# Patient Record
Sex: MALE | Race: OTHER | NOT HISPANIC OR LATINO | URBAN - METROPOLITAN AREA
[De-identification: names, ages, dates, MRNs, and addresses within clinical notes are randomized per-mention and may not be internally consistent; named-entity substitution may affect disease eponyms.]

---

## 2023-07-06 DIAGNOSIS — K63.5 COLON POLYPS: Primary | ICD-10-CM

## 2023-09-05 ENCOUNTER — OFFICE VISIT (OUTPATIENT)
Dept: GASTROENTEROLOGY | Facility: CLINIC | Age: 73
End: 2023-09-05
Payer: MEDICARE

## 2023-09-05 VITALS
OXYGEN SATURATION: 96 % | DIASTOLIC BLOOD PRESSURE: 78 MMHG | WEIGHT: 146 LBS | HEIGHT: 70 IN | BODY MASS INDEX: 20.9 KG/M2 | HEART RATE: 54 BPM | SYSTOLIC BLOOD PRESSURE: 130 MMHG

## 2023-09-05 DIAGNOSIS — Z80.0 FAMILY HISTORY OF COLON CANCER: ICD-10-CM

## 2023-09-05 DIAGNOSIS — Z86.010 HISTORY OF COLON POLYPS: ICD-10-CM

## 2023-09-05 DIAGNOSIS — K57.30 DIVERTICULOSIS OF COLON: Primary | ICD-10-CM

## 2023-09-05 DIAGNOSIS — C61 PROSTATE CANCER: ICD-10-CM

## 2023-09-05 PROCEDURE — 99213 PR OFFICE/OUTPT VISIT, EST, LEVL III, 20-29 MIN: ICD-10-PCS | Mod: S$GLB,,,

## 2023-09-05 PROCEDURE — 99213 OFFICE O/P EST LOW 20 MIN: CPT | Mod: S$GLB,,,

## 2023-09-05 RX ORDER — SOD SULF/POT CHLORIDE/MAG SULF 1.479 G
12 TABLET ORAL DAILY
Qty: 24 TABLET | Refills: 0 | Status: SHIPPED | OUTPATIENT
Start: 2023-09-05 | End: 2023-09-05

## 2023-09-05 RX ORDER — CYCLOBENZAPRINE HCL 10 MG
TABLET ORAL
COMMUNITY
Start: 2023-08-31

## 2023-09-05 RX ORDER — ROSUVASTATIN CALCIUM 10 MG/1
10 TABLET, COATED ORAL NIGHTLY
COMMUNITY
Start: 2023-04-28

## 2023-09-05 RX ORDER — POLYETHYLENE GLYCOL 3350, SODIUM SULFATE ANHYDROUS, SODIUM BICARBONATE, SODIUM CHLORIDE, POTASSIUM CHLORIDE 236; 22.74; 6.74; 5.86; 2.97 G/4L; G/4L; G/4L; G/4L; G/4L
4 POWDER, FOR SOLUTION ORAL ONCE
Qty: 4000 ML | Refills: 0 | Status: SHIPPED | OUTPATIENT
Start: 2023-09-05 | End: 2023-09-05

## 2023-09-05 RX ORDER — TELMISARTAN 40 MG/1
40 TABLET ORAL
COMMUNITY
Start: 2023-05-25

## 2023-09-05 NOTE — PROGRESS NOTES
Clinic Note    Reason for visit:  The primary encounter diagnosis was Diverticulosis of colon. Diagnoses of History of colon polyps, Family history of colon cancer, and Prostate cancer were also pertinent to this visit.    PCP: Tereso Yancey.   [address] [facility]    HPI:  This is a 72 y.o. male who was last seen by Dr. Castro in 11/2020. Patient denies any reflux, dysphagia, abdominal pain, constipation, diarrhea, or blood in stool. He was recently diagnosed with prostate and bone cancer and may be having surgery with Dr. Conor cooley.     Colonoscopy 11/10/2020: Moderate diverticulosis of the sigmoid colon. Serrated polyp-will need to change repeat colon to 3 years instead of 5 due to this type of polyp found    Review of Systems   Constitutional:  Negative for diaphoresis, fatigue, fever and unexpected weight change.   HENT:  Negative for hearing loss, mouth sores, postnasal drip, sore throat and trouble swallowing.    Eyes:  Negative for pain, discharge and eye dryness.   Respiratory:  Negative for apnea, cough, choking, chest tightness, shortness of breath and wheezing.    Cardiovascular:  Negative for chest pain, palpitations and leg swelling.   Gastrointestinal:  Negative for abdominal distention, abdominal pain, anal bleeding, blood in stool, change in bowel habit, constipation, diarrhea, nausea, rectal pain, vomiting, reflux, fecal incontinence and change in bowel habit.   Genitourinary:  Negative for bladder incontinence, dysuria and hematuria.   Musculoskeletal:  Negative for arthralgias, back pain and joint swelling.   Integumentary:  Negative for color change and rash.   Allergic/Immunologic: Negative for environmental allergies and food allergies.   Neurological:  Negative for seizures and headaches.   Hematological:  Negative for adenopathy. Does not bruise/bleed easily.        Past Medical History:   Diagnosis Date    Bilateral leg cramps     Bone cancer  "08/2023    HTN (hypertension)     Mixed hyperlipidemia     Personal history of colonic polyps     Prostate cancer 08/2023     Past Surgical History:   Procedure Laterality Date    ABDOMINAL HERNIA REPAIR      ANKLE FUSION Right     CATARACT EXTRACTION      COLONOSCOPY  2000    ROTATOR CUFF REPAIR Left     SKIN GRAFT Right     to R leg , graft from L leg     Family History   Problem Relation Age of Onset    Colon cancer Mother      Social History     Tobacco Use    Smoking status: Former     Types: Cigarettes    Smokeless tobacco: Never    Tobacco comments:     Quit smoking about 15-20 years ago    Substance Use Topics    Alcohol use: Yes     Comment: seldom    Drug use: Never     Review of patient's allergies indicates:  No Known Allergies   Medication List with Changes/Refills   New Medications    POLYETHYLENE GLYCOL (GOLYTELY) 236-22.74-6.74 -5.86 GRAM SUSPENSION    Take 4,000 mLs (4 L total) by mouth once. for 1 dose   Current Medications    CYCLOBENZAPRINE (FLEXERIL) 10 MG TABLET        ROSUVASTATIN (CRESTOR) 10 MG TABLET    Take 10 mg by mouth every evening.    TELMISARTAN (MICARDIS) 40 MG TAB    Take 40 mg by mouth.         Vital Signs:  /78   Pulse (!) 54   Ht 5' 10" (1.778 m)   Wt 66.2 kg (146 lb)   SpO2 96%   BMI 20.95 kg/m²        Physical Exam  Constitutional:       General: He is not in acute distress.     Appearance: Normal appearance. He is well-developed. He is not ill-appearing or toxic-appearing.   HENT:      Head: Normocephalic and atraumatic.      Nose: Nose normal.      Mouth/Throat:      Mouth: Mucous membranes are moist.      Pharynx: Oropharynx is clear. No oropharyngeal exudate or posterior oropharyngeal erythema.   Eyes:      General: Lids are normal. No scleral icterus.        Right eye: No discharge.         Left eye: No discharge.      Conjunctiva/sclera: Conjunctivae normal.   Cardiovascular:      Rate and Rhythm: Normal rate and regular rhythm.      Pulses:           Radial " pulses are 2+ on the right side and 2+ on the left side.   Pulmonary:      Effort: Pulmonary effort is normal. No respiratory distress.      Breath sounds: No stridor. No wheezing.   Abdominal:      General: Bowel sounds are normal. There is no distension.      Palpations: Abdomen is soft. There is no fluid wave, hepatomegaly, splenomegaly or mass.      Tenderness: There is no abdominal tenderness. There is no guarding or rebound.   Musculoskeletal:      Cervical back: Full passive range of motion without pain.   Lymphadenopathy:      Cervical: No cervical adenopathy.   Skin:     General: Skin is warm and dry.      Capillary Refill: Capillary refill takes less than 2 seconds.      Coloration: Skin is not cyanotic, jaundiced or pale.   Neurological:      General: No focal deficit present.      Mental Status: He is alert and oriented to person, place, and time.   Psychiatric:         Mood and Affect: Mood normal.         Behavior: Behavior is cooperative.            All of the data above and below has been reviewed by myself and any further interpretations will be reflected in the assessment and plan.   The data includes review of external notes, and independent interpretation of lab results, procedures, x-rays, and imaging reports.      Assessment:  Diverticulosis of colon    History of colon polyps  -     Ambulatory referral/consult to Gastroenterology  -     Ambulatory Referral to External Surgery    Family history of colon cancer  -     Ambulatory Referral to External Surgery  -     Discontinue: sod sulf-pot chloride-mag sulf (SUTAB) 1.479-0.188- 0.225 gram tablet; Take 12 tablets by mouth once daily. Take according to package instructions with indicated amount of water. No breakfast day before test. May substitute with Suprep, Clenpiq, Plenvu, Moviprep or GoLytely based on Rx plan and patient preference.  Dispense: 24 tablet; Refill: 0  -     polyethylene glycol (GOLYTELY) 236-22.74-6.74 -5.86 gram suspension;  Take 4,000 mLs (4 L total) by mouth once. for 1 dose  Dispense: 4000 mL; Refill: 0    Prostate cancer    Will be having prostatectomy soon likely. Will schedule colon in 12/2023 and he will let us know if needs to reschedule.      Recommendations:  Schedule colonoscopy with Dr. Castro in 12/2023. Notify my office if you need to reschedule.     Risks, benefits, and alternatives of medical management, any associated procedures, and/or treatment discussed with the patient. Patient given opportunity to ask questions and voices understanding. Patient has elected to proceed with the recommended care modalities as discussed.    No follow-ups on file.    Order summary:  Orders Placed This Encounter   Procedures    Ambulatory Referral to External Surgery        Instructed patient to notify my office if they have not been contacted within two weeks after any procedures, submitting any samples (biopsies, blood, stool, urine, etc.) or after any imaging (X-ray, CT, MRI, etc.).      Deann Medellin NP    This document may have been created using a voice recognition transcribing system. Incorrect words or phrases may have been missed during proofreading. Please interpret accordingly or contact me for clarification.

## 2023-09-05 NOTE — PATIENT INSTRUCTIONS
Schedule colonoscopy with Dr. Castro in 12/2023. Notify my office if you need to reschedule.     Please notify my office if you have not been contacted within two weeks after any procedures, submitting any samples (biopsies, blood, stool, urine, etc.) or after any imaging (X-ray, CT, MRI, etc.).

## 2023-12-15 ENCOUNTER — TELEPHONE (OUTPATIENT)
Dept: GASTROENTEROLOGY | Facility: CLINIC | Age: 73
End: 2023-12-15
Payer: MEDICARE

## 2023-12-15 VITALS — HEIGHT: 70 IN | WEIGHT: 146 LBS | BODY MASS INDEX: 20.9 KG/M2

## 2023-12-15 DIAGNOSIS — Z80.0 FAMILY HISTORY OF COLON CANCER: ICD-10-CM

## 2023-12-15 DIAGNOSIS — Z86.010 HISTORY OF COLON POLYPS: Primary | ICD-10-CM

## 2023-12-15 NOTE — TELEPHONE ENCOUNTER
[facility]  [address] [address]  Phone: [phone]  Fax: [phone]    History & Physical         Provider: Dr. Kaiden Castro    Patient Name: Zhang HANDLEY (age):1950  73 y.o.           Gender: male   Phone: [phone]     Referring Physician: Tereso Yancey     Vital Signs:   Height - 5' 10  Weight - 146 LB  BMI -  20.95    Plan: COLONOSCOPY @ [facility]    Encounter Diagnoses   Name Primary?    History of colon polyps Yes    Family history of colon cancer            History:      Past Medical History:   Diagnosis Date    Bilateral leg cramps     Bone cancer 2023    HTN (hypertension)     Mixed hyperlipidemia     Personal history of colonic polyps     Prostate cancer 2023      Past Surgical History:   Procedure Laterality Date    ABDOMINAL HERNIA REPAIR      ANKLE FUSION Right     CATARACT EXTRACTION      COLONOSCOPY      ROTATOR CUFF REPAIR Left     SKIN GRAFT Right     to R leg , graft from L leg      Medication List with Changes/Refills   Current Medications    CYCLOBENZAPRINE (FLEXERIL) 10 MG TABLET        ROSUVASTATIN (CRESTOR) 10 MG TABLET    Take 10 mg by mouth every evening.    TELMISARTAN (MICARDIS) 40 MG TAB    Take 40 mg by mouth.      Review of patient's allergies indicates:  No Known Allergies   Family History   Problem Relation Age of Onset    Colon cancer Mother       Social History     Tobacco Use    Smoking status: Former     Types: Cigarettes    Smokeless tobacco: Never    Tobacco comments:     Quit smoking about 15-20 years ago    Substance Use Topics    Alcohol use: Yes     Comment: seldom    Drug use: Never        Physical Examination:     General Appearance:___________________________  HEENT:  _____________________________________  Abdomen:____________________________________  Heart:________________________________________  Lungs:_______________________________________  Extremities:___________________________________  Skin:_________________________________________  Endocrine:____________________________________  Genitourinary:_________________________________  Neurological:__________________________________      Patient has been evaluated immediately prior to sedation and is medically cleared for endoscopy with IVCS as an ASA class: ______      Physician Signature: _________________________       Date: ________  Time: ________

## 2023-12-15 NOTE — TELEPHONE ENCOUNTER
Called and left detailed message that I was calling as a courtesy regarding his upcoming Colon with [facility] on 12/22/23 Friday and wanted to verify that he had his prep instructions and meds. I also mentioned that [facility] will call on Thurs with the arrival time. tika

## 2023-12-22 ENCOUNTER — OUTSIDE PLACE OF SERVICE (OUTPATIENT)
Dept: GASTROENTEROLOGY | Facility: CLINIC | Age: 73
End: 2023-12-22
Payer: MEDICARE

## 2023-12-22 LAB — CRC RECOMMENDATION EXT: NORMAL

## 2023-12-22 PROCEDURE — 45385 COLONOSCOPY W/LESION REMOVAL: CPT | Mod: PT,,, | Performed by: INTERNAL MEDICINE

## 2024-01-24 ENCOUNTER — DOCUMENTATION ONLY (OUTPATIENT)
Dept: GASTROENTEROLOGY | Facility: CLINIC | Age: 74
End: 2024-01-24
Payer: MEDICARE